# Patient Record
Sex: FEMALE | Race: WHITE | URBAN - METROPOLITAN AREA
[De-identification: names, ages, dates, MRNs, and addresses within clinical notes are randomized per-mention and may not be internally consistent; named-entity substitution may affect disease eponyms.]

---

## 2022-07-29 ENCOUNTER — OFFICE VISIT (OUTPATIENT)
Dept: URBAN - METROPOLITAN AREA CLINIC 30 | Facility: CLINIC | Age: 37
End: 2022-07-29
Payer: COMMERCIAL

## 2022-07-29 DIAGNOSIS — H47.10 UNSPECIFIED PAPILLEDEMA: Primary | ICD-10-CM

## 2022-07-29 PROCEDURE — 92133 CPTRZD OPH DX IMG PST SGM ON: CPT | Performed by: STUDENT IN AN ORGANIZED HEALTH CARE EDUCATION/TRAINING PROGRAM

## 2022-07-29 PROCEDURE — 92134 CPTRZ OPH DX IMG PST SGM RTA: CPT | Performed by: STUDENT IN AN ORGANIZED HEALTH CARE EDUCATION/TRAINING PROGRAM

## 2022-07-29 PROCEDURE — 99204 OFFICE O/P NEW MOD 45 MIN: CPT | Performed by: STUDENT IN AN ORGANIZED HEALTH CARE EDUCATION/TRAINING PROGRAM

## 2022-07-29 ASSESSMENT — KERATOMETRY
OD: 43.94
OS: 43.84

## 2022-07-29 ASSESSMENT — VISUAL ACUITY
OD: 20/20
OS: 20/20

## 2022-07-29 ASSESSMENT — INTRAOCULAR PRESSURE
OS: 15
OD: 15

## 2022-07-29 NOTE — IMPRESSION/PLAN
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
-- Referred for papilledema and possible ICP elevation, no IOP elevation -- Currently under care of Dr Tea Lockahrt (neurologist) -- ddx IIH? see scanned notes for MRI details Plan: Nerve hyperemic polo with mild elevation. Tr blurred margins, no bv obscuration OCT RNFL 
  OD 83um / thin inf, wnl 270 OS 90um / wnl 360 OCT GCA
  OD thin 360 OS wnl 360 OU normal fov contour and layers intact Pt ed findings today and concern for IIH given empty sella on MRI without other mass/lesions. Ed cont care with Dr Paul Santo, per pt has discussed lumbar puncture but has f/u for further plans next week.

## 2022-08-10 ENCOUNTER — OFFICE VISIT (OUTPATIENT)
Dept: URBAN - METROPOLITAN AREA CLINIC 30 | Facility: CLINIC | Age: 37
End: 2022-08-10
Payer: COMMERCIAL

## 2022-08-10 DIAGNOSIS — H53.19 OTHER SUBJECTIVE VISUAL DISTURBANCES: ICD-10-CM

## 2022-08-10 DIAGNOSIS — H47.10 UNSPECIFIED PAPILLEDEMA: Primary | ICD-10-CM

## 2022-08-10 PROCEDURE — 92250 FUNDUS PHOTOGRAPHY W/I&R: CPT | Performed by: STUDENT IN AN ORGANIZED HEALTH CARE EDUCATION/TRAINING PROGRAM

## 2022-08-10 PROCEDURE — 99213 OFFICE O/P EST LOW 20 MIN: CPT | Performed by: STUDENT IN AN ORGANIZED HEALTH CARE EDUCATION/TRAINING PROGRAM

## 2022-08-10 PROCEDURE — 92134 CPTRZ OPH DX IMG PST SGM RTA: CPT | Performed by: STUDENT IN AN ORGANIZED HEALTH CARE EDUCATION/TRAINING PROGRAM

## 2022-08-10 ASSESSMENT — INTRAOCULAR PRESSURE
OS: 14
OD: 14

## 2022-08-10 NOTE — IMPRESSION/PLAN
Impression: Other subjective visual disturbances: H53.19. Plan: Intermittent rings of pulsating lights x 5 days since EEG. Denies changes in floaters or vision. Per pt also has associated HA but reassured pt no e/o ocular pathology for phenomenon at this time. Retina flat & intact with ?nerve edema but ~stable vs ZEE x 7/2022. Pt ed to f/u with neurologist for other possible causes.

## 2022-08-10 NOTE — IMPRESSION/PLAN
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
-- Referred for papilledema and possible ICP elevation, no IOP elevation -- Currently under care of Dr Caryn Akins (neurologist) -- ddx IIH? see scanned notes for MRI details Plan: Micheal rim elevation OU, slight hyperemia OS. Tr blurred margins, no bv obscuration OCT RNFL (7/29/22) OD 83um / thin inf, wnl 270 OS 90um / wnl 360 OCT GCA (7/29/22) OD thin 360 / OS wnl 360 OU normal fov contour and layers intact Photo OU today (8/10/22) Pt ed stable findings and concerns for IIH given empty sella on MRI without other mass/lesions. Per pt appt with Dr Alexandria Wright today for EEG testing/results but unsure of findings at this time. Has f/u appt 8/22/22, recommend pt discuss with providers regarding possible causes and potential concern for IIH and lumbar puncture to r/o dx.

## 2022-08-26 ENCOUNTER — OFFICE VISIT (OUTPATIENT)
Dept: URBAN - METROPOLITAN AREA CLINIC 30 | Facility: CLINIC | Age: 37
End: 2022-08-26
Payer: COMMERCIAL

## 2022-08-26 DIAGNOSIS — H53.19 OTHER SUBJECTIVE VISUAL DISTURBANCES: ICD-10-CM

## 2022-08-26 DIAGNOSIS — H47.10 UNSPECIFIED PAPILLEDEMA: Primary | ICD-10-CM

## 2022-08-26 DIAGNOSIS — H53.453: ICD-10-CM

## 2022-08-26 PROCEDURE — 92083 EXTENDED VISUAL FIELD XM: CPT | Performed by: STUDENT IN AN ORGANIZED HEALTH CARE EDUCATION/TRAINING PROGRAM

## 2022-08-26 PROCEDURE — 99213 OFFICE O/P EST LOW 20 MIN: CPT | Performed by: STUDENT IN AN ORGANIZED HEALTH CARE EDUCATION/TRAINING PROGRAM

## 2022-08-26 ASSESSMENT — INTRAOCULAR PRESSURE
OS: 15
OD: 16

## 2022-08-26 NOTE — IMPRESSION/PLAN
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
-- Referred for papilledema and possible ICP elevation, no IOP elevation -- Currently under care of Dr Dakota Coleman (neurologist) -- ddx IIH? see scanned notes for MRI details Plan: Micheal rim elevation OU, slight hyperemia OS. Tr blurred margins, no bv obscuration OCT RNFL (7/29/22) OD 83um / thin inf, wnl 270 OS 90um / wnl 360 OCT GCA (7/29/22) OD thin 360 / OS wnl 360 OU normal fov contour and layers intact Photo OU Baseline x 8/10/22, updated today & stable with edema OS>OD
HVF 30-2 x 8/26/22 Severe constriction OD>OS, central island of vision remaining only (~15 degrees) May be 2' to prev edema and resulting atrophy Pt ed stable findings and concerns for IIH given empty sella on MRI without other mass/lesions. Per pt Dr Taz Ochoa ed pt that EEG testing/results were wnl OU. Has f/u appt with PCP 9/1/22 with neurology f/u 9/19/22. Recommend consult with neuroophthalmology at this point; pt given information for Dr Rocky Benavidez.  Pt will contact offices after appointment today; pt given notes from today's exam

## 2022-08-26 NOTE — IMPRESSION/PLAN
Impression: Other subjective visual disturbances: H53.19. Plan: Pt ed likely associated with prev discussed symptoms vs neuro symptoms given concern for cysts and elevated ICP.  Recommend cont care with neurology but also to set up appt with Dr James Nurse for neuroophthalmology